# Patient Record
Sex: MALE | Race: WHITE | NOT HISPANIC OR LATINO | Employment: FULL TIME | URBAN - METROPOLITAN AREA
[De-identification: names, ages, dates, MRNs, and addresses within clinical notes are randomized per-mention and may not be internally consistent; named-entity substitution may affect disease eponyms.]

---

## 2018-05-28 ENCOUNTER — HOSPITAL ENCOUNTER (EMERGENCY)
Facility: MEDICAL CENTER | Age: 38
End: 2018-05-28
Attending: EMERGENCY MEDICINE

## 2018-05-28 VITALS
WEIGHT: 262.79 LBS | DIASTOLIC BLOOD PRESSURE: 69 MMHG | RESPIRATION RATE: 16 BRPM | SYSTOLIC BLOOD PRESSURE: 119 MMHG | HEIGHT: 72 IN | TEMPERATURE: 97.4 F | HEART RATE: 104 BPM | OXYGEN SATURATION: 97 % | BODY MASS INDEX: 35.59 KG/M2

## 2018-05-28 DIAGNOSIS — S46.211A TEAR OF RIGHT BICEPS MUSCLE, INITIAL ENCOUNTER: ICD-10-CM

## 2018-05-28 PROCEDURE — 99283 EMERGENCY DEPT VISIT LOW MDM: CPT

## 2018-05-28 ASSESSMENT — ENCOUNTER SYMPTOMS
NAUSEA: 0
VOMITING: 0
FEVER: 0
FALLS: 0

## 2018-05-28 ASSESSMENT — PAIN SCALES - GENERAL: PAINLEVEL_OUTOF10: 6

## 2018-05-28 NOTE — ED NOTES
All DC discussed for bicep tear.  He has been educated on use of sling. He verbalized understand that it is important for follow up with Orthopedics. Pt verbalized understanding of all DC instructions and follow up recommendations. Pt ambulated to lobby with upright and steady gait.

## 2018-05-28 NOTE — ED NOTES
Report right arm pain, some changes in muscle shape compared to left arm. CMS intact to lower arm.

## 2018-05-28 NOTE — DISCHARGE INSTRUCTIONS
Keep your arm in the sling. Follow-up with the Orthopaedic Surgeon. Return to the ER for any new medical problems or concerns    Muscle Tear  A muscle tear is usually caused by over-exertion or stretching. The muscle often takes a while to heal. Muscle tears require 3 to 4 weeks to heal completely. A history of the injury and a physical exam may be performed. Sometimes, the injury is identified with radiographs and an MRI. Treatment for muscle injuries includes:  · Resting and protecting the affected area until pain with motion is gone.  · Putting ice on the injured area.  ¨ Put ice in a bag.  ¨ Place a towel between your skin and the bag.  ¨ Leave the ice on for 15 to 20 minutes, 3 to 4 times a day.  ¨ After two days, you can use heat to relieve spasms.  · Using compression wraps to help control swelling and limit movement.  · Raising (elevate) the injured area above the level of the heart (if possible) for the first 1 to 2 days after the injury.  · Medicine may be prescribed to reduce pain and inflammation.  Avoid strenuous activities that bring on muscle pain. Exercises to strengthen and stretch the injured muscle can help heal the muscle and prevent repeated injury. After the pain and swelling are gone, you may begin gradual strengthening exercises. Begin range-of-motion exercises and gentle stretching after 3 to 4 days of rest.   SEEK MEDICAL CARE IF:   Your injured muscle is not improving after 1 week of treatment.     This information is not intended to replace advice given to you by your health care provider. Make sure you discuss any questions you have with your health care provider.     Document Released: 01/25/2006 Document Revised: 03/11/2013 Document Reviewed: 07/02/2010  [employer] Interactive Patient Education ©2016 [employer]

## 2018-05-28 NOTE — ED TRIAGE NOTES
"Amb to triage w/ c/o RUE pain.  Pt states that he was pushing a heavy object 2 month ago and felt a \"pop\" at his bicep.    "

## 2018-05-28 NOTE — ED PROVIDER NOTES
ED Provider Note    Scribed for Dino Martinez M.D. by Kristina Liang. 5/28/2018, 1:04 PM.    Primary care provider: No primary care provider on file.  Means of arrival: Walk-In  History obtained from: Patient  History limited by: None    CHIEF COMPLAINT  Chief Complaint   Patient presents with   • Arm Pain     x 2 months     HPI  Camron Pang is a 38 y.o. male who presents to the Emergency Department complaining of right arm pain onset 2 months. Patient was pushing a heavy object 2 months ago when he felt a pop and heard 3 pops.  He's had decreased strength and noticed a deformity to his distal biceps is that time.  Denies any pertinent past medical problems including diabetes or hypertension. Denies allergies to medications. Denies taking any regular medications.     REVIEW OF SYSTEMS - E  Review of Systems   Constitutional: Negative for fever.   Gastrointestinal: Negative for nausea and vomiting.   Musculoskeletal: Negative for falls.        Positive for arm pain     PAST MEDICAL HISTORY    No past medical history on file.    SURGICAL HISTORY  patient denies any surgical history    SOCIAL HISTORY  Accompanied by wife.     FAMILY HISTORY  No family history on file.    CURRENT MEDICATIONS  Current medications can be seen on the nurse's note.     ALLERGIES  No Known Allergies    PHYSICAL EXAM  VITAL SIGNS: /69   Pulse (!) 104   Temp 36.3 °C (97.4 °F)   Resp 16   Ht 1.829 m (6')   Wt 119.2 kg (262 lb 12.6 oz)   SpO2 97%   BMI 35.64 kg/m²   Vitals reviewed.    Constitutional: *Well developed, Well nourished, No acute distress, Non-toxic appearance.   HENT: Normocephalic, Atraumatic  Musculoskeletal: Right arm has no tenderness of the shoulder, elbow or wrist.  Normal neurovascular exam.  Good pulses.  There is a small defect with the bicep insert.  This is consistent with a biceps rupture.  There is no tenderness, redness, swelling or fullness.  Neurologic: Alert, No focal deficits noted.    Psychiatric: Affect normal    COURSE & MEDICAL DECISION MAKING  Pertinent Labs & Imaging studies reviewed. (See chart for details)    1:04 PM - Patient seen and examined at bedside. I suspect the patient tore his bicep head. I have encouraged him to see a specialist. He will be treated with a sling today and then discharged home after interventions.     Patient is placed in a sling.  He is made aware that he needs follow-up with orthopedic doctor.  He is referred to orthopedist counseled about the diagnosis, the need for follow-up.  States he will comply.      The patient will return for new or worsening symptoms and is stable at the time of discharge.    The patient is referred to a primary physician for blood pressure management, diabetic screening, and for all other preventative health concerns.    DISPOSITION:  Patient will be discharged home in stable condition.    FOLLOW UP:  Jasiel Valero M.D.  [address] [facility] [address]  [phone]    Schedule an appointment as soon as possible for a visit in 2 days        OUTPATIENT MEDICATIONS:  New Prescriptions    No medications on file         FINAL IMPRESSION  1. Tear of right biceps muscle, initial encounter          Kristina FLORES (Martaibwarren), am scribing for, and in the presence of, Dino Martinez M.D..    Electronically signed by: Kristina Liang (Hola), 5/28/2018    Dino FLORES M.D. personally performed the services described in this documentation, as scribed by Kristina Liang in my presence, and it is both accurate and complete.    The note accurately reflects work and decisions made by me.  Dino Martinez  5/28/2018  1:21 PM

## 2018-06-06 ENCOUNTER — HOSPITAL ENCOUNTER (EMERGENCY)
Facility: MEDICAL CENTER | Age: 38
End: 2018-06-06
Attending: EMERGENCY MEDICINE

## 2018-06-06 VITALS
TEMPERATURE: 97 F | RESPIRATION RATE: 16 BRPM | BODY MASS INDEX: 36.48 KG/M2 | SYSTOLIC BLOOD PRESSURE: 120 MMHG | DIASTOLIC BLOOD PRESSURE: 70 MMHG | HEART RATE: 71 BPM | WEIGHT: 268.96 LBS | OXYGEN SATURATION: 98 %

## 2018-06-06 DIAGNOSIS — S46.211A RUPTURE OF RIGHT BICEPS TENDON, INITIAL ENCOUNTER: ICD-10-CM

## 2018-06-06 PROCEDURE — 99283 EMERGENCY DEPT VISIT LOW MDM: CPT

## 2018-06-06 ASSESSMENT — PAIN SCALES - GENERAL: PAINLEVEL_OUTOF10: 4

## 2018-06-06 NOTE — ED TRIAGE NOTES
Chief Complaint   Patient presents with   • Arm Pain     dx with torn right bicep after pulling a pallet of water 2 months ago     Ambulatory to triage. Right arm in sling. VSS. Explained triage process, to waiting room. Asked to inform RN if questions or concerns arise.

## 2018-06-06 NOTE — ED NOTES
ERP eval complete. Workers comp paperwork complete. Pt states understanding of dc instructions and f/u care. Pt able to amb out w/ steady gait.

## 2018-06-06 NOTE — DISCHARGE INSTRUCTIONS
Tendon Injury  Tendons are strong, cordlike structures that connect muscle to bone. Tendons are made up of woven fibers, like a rope. A tendon injury is a tear (rupture) of the tendon. The rupture may be partial (only a few of the fibers in your tendon rupture) or complete (your entire tendon ruptures).  CAUSES   Tendon injuries can be caused by high-stress activities, such as sports. They also can be caused by a repetitive injury or by a single injury from an excessive, rapid force.  SYMPTOMS   Symptoms of tendon injury include pain when you move the joint close to the tendon. Other symptoms are swelling, redness, and warmth.  DIAGNOSIS   Tendon injuries often can be diagnosed by physical exam. However, sometimes an X-ray exam or advanced imaging, such as magnetic resonance imaging (MRI), is necessary to determine the extent of the injury.  TREATMENT   Partial tendon ruptures often can be treated with immobilization. A splint, bandage, or removable brace usually is used to immobilize the injured tendon. Most injured tendons need to be immobilized for 1-2 months before they are completely healed. Complete tendon ruptures may require surgical reattachment.     This information is not intended to replace advice given to you by your health care provider. Make sure you discuss any questions you have with your health care provider.     Document Released: 01/25/2006 Document Revised: 12/06/2012 Document Reviewed: 03/10/2013  [employer] Interactive Patient Education ©2016 [employer]

## 2018-06-06 NOTE — ED PROVIDER NOTES
CHIEF COMPLAINT  Chief Complaint   Patient presents with   • Arm Pain     dx with torn right bicep after pulling a pallet of water 2 months ago       HPI (1)  Camron Pang is a 38 y.o. male with no significant PMH who presents to the ED with c/o right arm pain since 2 months s/p injury after pushing a pallet of water. States that he heard a pop and since then has been having pain in his right biceps area. He was seen in the ED on 5/28 for the same complaints and was discharged with a sling and referral to orthopedics for possible surgical intervention. He states that he couldn't follow up with ortho as he has medicaid. Denies any worsening of pain from baseline but states that he cant go to work as he is right handed and his work involves a lot of physical activity. Denies any paresthesias/ loss of sensation in the arm.  Also c/o left arm pain since an year, associated with tingling and numbness in his left thumb.     REVIEW OF SYSTEMS(1)  Pertinent positives include: Right arm pain- at site of biceps tendon insertion.  Pertinent negatives include: no paresthesias/ loss of sensation/decreased strength.      PAST MEDICAL HISTORY(PFS1)  History reviewed. No pertinent past medical history.    SOCIAL HISTORY  Social History   Substance Use Topics   • Smoking status: Current Every Day Smoker   • Smokeless tobacco: Not on file   • Alcohol use Yes   a  History   Drug Use No       SURGICAL HISTORY  History reviewed. No pertinent surgical history.    CURRENT MEDICATIONS  Home Medications     Reviewed by Rianna Wyatt R.N. (Registered Nurse) on 06/06/18 at 1342  Med List Status: <None>   Medication Last Dose Status        Patient Roman Taking any Medications                       ALLERGIES  No Known Allergies    PHYSICAL EXAM (2)  VITAL SIGNS: /76   Pulse 81   Temp 36.1 °C (97 °F)   Resp 16   Wt 122 kg (268 lb 15.4 oz)   SpO2 98%   BMI 36.48 kg/m²    Constitutional:  Not in acute  distress.  Extremities: Right upper extremity: swollen and  tender to touch over the right biceps. Possible rupture- tendon seen shifted to the medial side. Normal sensation and pulses intact. Good  strength. No warmth/erythema/paresthesias.  ROM- limited flexion at the right elbow and painful abduction.     DIFFERENTIAL DIAGNOSIS:  Biceps tendon rupture    RADIOLOGY/PROCEDURES  No orders to display       LABORATORY: Reviewed as below.  No results found for this or any previous visit.    INTERVENTIONS:  None    COURSE & MEDICAL DECISION MAKING  38 y.o male who presented with right arm pain since 2 months s/p injury after pushing a pallet of water. Was seen in the ED on 5/28 and a referral to orthopedics was made for possible surgical intervention. Couldn't follow up with ortho because of insurance issues. Denies any worsening of pain from baseline. States that he is unable to return to work given his pain.  On exam: likely biceps tendon rupture- tendon seen shifted to medial side. No weakness/loss of sensation. Pulses intact.    PLAN:  Referral to orthopedics made to establish care.  Workers compensation note given    CONDITION: good.    FINAL IMPRESSION  1. Rupture of right biceps tendon, initial encounter          Electronically signed by: Jadyn Frias, 6/6/2018 1:58 PM    I independently evaluated the patient and repeated the important components of the history and physical.  I discussed the management with the resident.  I have reviewed and agree with the pertinent clinical information as above including history, exam, study findings and recommendations.

## 2018-06-06 NOTE — LETTER
FORM C-4:  EMPLOYEE’S CLAIM FOR COMPENSATION/ REPORT OF INITIAL TREATMENT  EMPLOYEE’S CLAIM - PROVIDE ALL INFORMATION REQUESTED   First Name  Camron Last Name  Bird Birthdate             Age  1980 38 y.o. Sex  male Claim Number   Home Employee Address  [address]  [facility]                                     [place]  [address] [address]    Weight  122 kg (268 lb 15.4 oz) N  [phone]    Mailing Employee Address                           [address]   [facility]               Zip  [address] Telephone  [phone] (home)  Primary Language Spoken  ENGLISH   Insurer  [employer] Third [employer]   [employer] Employee's Occupation (Job Title) When Injury or Occupational Disease Occurred     Employer's Name  [employer] Telephone  [phone]    Employer Address  [address]] [place] [facility] [facility]  [address]   Date of Injury  4/10/2018       Hour of Injury  10:00 AM Date Employer Notified  4/10/2018 Last Day of Work after Injury or Occupational Disease  5/24/2018 Supervisor to Whom Injury Reported  [employer]   Address or Location of Accident (if applicable)  [address] [place] [address]   What were you doing at the time of accident? (if applicable)  Pulling a pallet of water    How did this injury or occupational disease occur? Be specific and answer in detail. Use additional sheet if necessary)  Pulling a pallet to unload the pallet of water   If you believe that you have an occupational disease, when did you first have knowledge of the disability and it relationship to your employment?  n/a Witnesses to the Accident  told Manager     Nature of Injury or Occupational Disease  Contusion  Part(s) of Body Injured or Affected  Lower Arm (R), N/A, N/A    I certify that the above is true and correct to the best of my knowledge and that I have provided this information in order to obtain the benefits of [facility]  Industrial Insurance and Occupational Diseases Acts [place] inclusive or Chapter 617 of [place]).  I hereby authorize any physician, chiropractor, surgeon, practitioner, or other person, any hospital, including [facility] or [facility], any medical service organization, any insurance company, or other institution or organization to release to each other, any medical or other information, including benefits paid or payable, pertinent to this injury or disease, except information relative to diagnosis, treatment and/or counseling for AIDS, psychological conditions, alcohol or controlled substances, for which I must give specific authorization.  A Photostat of this authorization shall be as valid as the original.   Date      06/06/2018 [facility] Employee’s Signature   THIS REPORT MUST BE COMPLETED AND MAILED WITHIN 3 WORKING DAYS OF TREATMENT   Place  [facility], EMERGENCY DEPT  Name of Facility   [facility]   Date  6/6/2018 Diagnosis  (S46.211A) Rupture of right biceps tendon, initial encounter Is there evidence the injured employee was under the influence of alcohol and/or another controlled substance at the time of accident?   Hour  2:14 PM Description of Injury or Disease  Rupture of right biceps tendon, initial encounter No   Treatment  examination  Have you advised the patient to remain off work five days or more?         Yes   X-Ray Findings    Comments:na   If Yes   From Date  6/6/2018 To Date  6/13/2018   From information given by the employee, together with medical evidence, can you directly connect this injury or occupational disease as job incurred?  Yes If No, is the employee capable of: Full Duty  No Modified Duty      Is additional medical care by a physician indicated?  Yes  Comments:orthopedics for surgery If Modified Duty, Specify any Limitations / Restrictions  No use of right arm     Do you know of any previous  "injury or disease contributing to this condition or occupational disease?  No   Date  6/6/2018 Print Doctor’s Name  Yao Ashley I certify the employer’s copy of this form was mailed on: 06/06/2018   Address  [address]  [place] [address] [phone] Insurer’s Use Only   [facility]  [id number]    Provider’s Tax ID Number  [id number] Telephone  Dept: [phone]    Doctor’s Signature  e-ASHLEY Rico M.D. Degree   MD    Original - TREATING PHYSICIAN OR CHIROPRACTOR   Pg 2-Insurer/TPA   Pg 3-Employer   Pg 4-Employee                                                                                                  Form C-4 (rev01/03)     BRIEF DESCRIPTION OF RIGHTS AND BENEFITS  (Pursuant to [place]RS 616C.050)    Notice of Injury or Occupational Disease (Incident Report Form C-1): If an injury or occupational disease (OD) arises out of and in the course of employment, you must provide written notice to your employer as soon as practicable, but no later than 7 days after the accident or OD. Your employer shall maintain a sufficient supply of the required forms.    Claim for Compensation (Form C-4): If medical treatment is sought, the form C-4 is available at the place of initial treatment. A completed \"Claim for Compensation\" (Form C-4) must be filed within 90 days after an accident or OD. The treating physician or chiropractor must, within 3 working days after treatment, complete and mail to the employer, the employer's insurer and third-party , the Claim for Compensation.    Medical Treatment: If you require medical treatment for your on-the-job injury or OD, you may be required to select a physician or chiropractor from a list provided by your workers’ compensation insurer, if it has contracted with an Organization for Managed Care (MCO) or Preferred Provider Organization (PPO) or providers of health care. If your employer has not entered into a contract with an MCO or PPO, you " may select a physician or chiropractor from the Panel of Physicians and Chiropractors. Any medical costs related to your industrial injury or OD will be paid by your insurer.    Temporary Total Disability (TTD): If your doctor has certified that you are unable to work for a period of at least 5 consecutive days, or 5 cumulative days in a 20-day period, or places restrictions on you that your employer does not accommodate, you may be entitled to TTD compensation.    Temporary Partial Disability (TPD): If the wage you receive upon reemployment is less than the compensation for TTD to which you are entitled, the insurer may be required to pay you TPD compensation to make up the difference. TPD can only be paid for a maximum of 24 months.    Permanent Partial Disability (PPD): When your medical condition is stable and there is an indication of a PPD as a result of your injury or OD, within 30 days, your insurer must arrange for an evaluation by a rating physician or chiropractor to determine the degree of your PPD. The amount of your PPD award depends on the date of injury, the results of the PPD evaluation and your age and wage.    Permanent Total Disability (PTD): If you are medically certified by a treating physician or chiropractor as permanently and totally disabled and have been granted a PTD status by your insurer, you are entitled to receive monthly benefits not to exceed 66 2/3% of your average monthly wage. The amount of your PTD payments is subject to reduction if you previously received a PPD award.    Vocational Rehabilitation Services: You may be eligible for vocational rehabilitation services if you are unable to return to the job due to a permanent physical impairment or permanent restrictions as a result of your injury or occupational disease.    Transportation and Per Haris Reimbursement: You may be eligible for travel expenses and per haris associated with medical treatment.  Reopening: You may be able  to reopen your claim if your condition worsens after claim closure.    Appeal Process: If you disagree with a written determination issued by the insurer or the insurer does not respond to your request, you may appeal to the Department of Administration, , by following the instructions contained in your determination letter. You must appeal the determination within 70 days from the date of the determination letter at [address], [address], [place], [place] [address], or [address], [facility] [address] [facility] [address], [place], [place] [address]. If you disagree with the  decision, you may appeal to the Department of Administration, . You must file your appeal within 30 days from the date of the  decision letter at [address], [address], [place], [place] [address], or [address], [facility] [address], [place], [place] [address]. If you disagree with a decision of an , you may file a petition for judicial review with the District Court. You must do so within 30 days of the Appeal Officer’s decision. You may be represented by an  at your own expense or you may contact the [facility] for possible representation.    [place]  for Injured Workers (NAIW): If you disagree with a  decision, you may request that NAIW represent you without charge at an  Hearing. For information regarding denial of benefits, you may contact the [facility] at: [address], [address], [place] [address], [phone], or [address], [place] [address], [phone]    To File a Complaint with the Division: If you wish to file a complaint with the  of the Division of Industrial Relations (DIR), please contact the Workers’ Compensation Section, [address], [address], [place], [place] [address], telephone [phone]) [phone], or [address], [place]  [address], telephone [phone].    For assistance with Workers’ Compensation Issues: you may contact the Office of the Governor Consumer Health Assistance, [address] [place] [address], [facility] [address], [place] [address], Toll Free 1-790.597.4746, Web site: http://[place].[email or web address].[email or web address]., E-mail [email or web address].                                                                                                                                                                               __________________________________________________________________                                    ________06/06/2018_________            Employee Name / Signature                                                                                                                            Date                                       D-2 (rev. 10/07)

## 2018-06-11 ENCOUNTER — OCCUPATIONAL MEDICINE (OUTPATIENT)
Dept: OCCUPATIONAL MEDICINE | Facility: CLINIC | Age: 38
End: 2018-06-11
Payer: COMMERCIAL

## 2018-06-11 VITALS
DIASTOLIC BLOOD PRESSURE: 88 MMHG | HEART RATE: 99 BPM | TEMPERATURE: 97.8 F | SYSTOLIC BLOOD PRESSURE: 130 MMHG | BODY MASS INDEX: 35.89 KG/M2 | HEIGHT: 72 IN | WEIGHT: 265 LBS | OXYGEN SATURATION: 96 %

## 2018-06-11 DIAGNOSIS — S46.219A BICEPS TENDON TEAR: ICD-10-CM

## 2018-06-11 PROCEDURE — 99203 OFFICE O/P NEW LOW 30 MIN: CPT | Performed by: PREVENTIVE MEDICINE

## 2018-06-11 ASSESSMENT — PAIN SCALES - GENERAL: PAINLEVEL: 6=MODERATE PAIN

## 2018-06-11 NOTE — LETTER
"[address],   [address] [place] [address]  Phone:  [phone] - Fax:  [phone]   Occupational Health [facility] Progress Report and Disability Certification  Date of Service: 6/11/2018   No Show:  No  Date / Time of Next Visit: 7/12/2018@8:30am   Claim Information   Patient Name: Camron Pang  Claim Number:     Employer: [employer]  Date of Injury: 4/10/2018     Insurer / TPA: [employer]  ID / SSN:     Occupation: Manager  Diagnosis: The encounter diagnosis was Biceps tendon tear.    Medical Information   Related to Industrial Injury?   Comments:will await final determination from insurance    Subjective Complaints:  Date of incident 4/10/2018. Incident-\"pulling a pallet to unload the palate water\". 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 He has been seen in the emergency department on 2 occasions. Orthopedic referral was recommended but has not been accomplished. He complains of pain in the antecubital area and weakness.   Objective Findings: Appearance: Well-developed, well-nourished.   Mental Status: Mood and Affect normal. Pleasant. Cooperative. Appropriate.   ENT: Oropharynx clear. Moist mucous membranes. Hearing normal.   Eyes: Pupils reactive. Conjunctiva normal. No scleral icterus.   Neck: Trachea Midline. No thyromegaly. No masses.  Cardiovascular: Normal rate. Regular rhythm. Normal heart sounds.   Chest: Effort normal. Breath sounds clear.   Skin: Skin is warm and dry. No rash.   Musculoskeletal: Right elbow shows mild tenderness in antecubital fossa. Deformity consistent with bicep tear. Distal neurovascular intact.     Pre-Existing Condition(s):     Assessment:   Condition Same    Status: Additional Care Required  Permanent Disability:No    Plan: DiagnosticsConsultation    Diagnostics: MRI    Comments:       Disability Information   Status: Released to Restricted Duty    From:  " 6/11/2018  Through: 7/12/2018 Restrictions are: Temporary   Physical Restrictions   Sitting:    Standing:    Stooping:    Bending:      Squatting:    Walking:    Climbing:    Pushing:  < or = to 1 hr/day   Pulling:  < or = to 1 hr/day Other:    Reaching Above Shoulder (L):   Reaching Above Shoulder (R):       Reaching Below Shoulder (L):    Reaching Below Shoulder (R):      Not to exceed Weight Limits   Carrying(hrs):   Weight Limit(lb):   Lifting(hrs):   Weight  Limit(lb): < or = to 10 pounds   Comments: Limited views of right arm    Repetitive Actions   Hands: i.e. Fine Manipulations from Grasping:     Feet: i.e. Operating Foot Controls:     Driving / Operate Machinery:     Physician Name: Aneudy Flores M.D. Physician Signature: ANEUDY Flor M.D. e-Signature: Dr. Yannick Montes, Medical Director   Clinic Name / Location: [address],   [address] [place] [address] Clinic Phone Number: Dept: [phone]   Appointment Time: 1:45 Pm Visit Start Time: 1:34 PM   Check-In Time:  1:26 Pm Visit Discharge Time:  2:00pm   Original-Treating Physician or Chiropractor    Page 2-Insurer/TPA    Page 3-Employer    Page 4-Employee

## 2018-06-11 NOTE — PROGRESS NOTES
"Subjective:      Camron Pang is a 38 y.o. male who presents with Follow-Up (WC DOI 4/10/2018 - R Arm -  Same - Room 24)      Date of incident 4/10/2018. Incident-\"pulling a pallet to unload the palate water\". 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 He has been seen in the emergency department on 2 occasions. Orthopedic referral was recommended but has not been accomplished. He complains of pain in the antecubital area and weakness.     HPI    ROS  Comprehensive medical history form reviewed. Pertinent positives and negatives included in HPI.    PFSH: reviewed in Epic    PMH:  has no past medical history on file.  MEDS: No current outpatient prescriptions on file.  ALLERGIES: No Known Allergies  SURGHX: History reviewed. No pertinent surgical history.  SOCHX:  reports that he has been smoking.  He uses smokeless tobacco. He reports that he drinks alcohol. He reports that he does not use drugs.  Work Status: Works as a  for a [employer]-not working currently due to layoff  FH: No pertinent hereditary disorders.        Objective:     /88   Pulse 99   Temp 36.6 °C (97.8 °F)   Ht 1.829 m (6')   Wt 120.2 kg (265 lb)   SpO2 96%   BMI 35.94 kg/m²      Physical Exam    Appearance: Well-developed, well-nourished.   Mental Status: Mood and Affect normal. Pleasant. Cooperative. Appropriate.   ENT: Oropharynx clear. Moist mucous membranes. Hearing normal.   Eyes: Pupils reactive. Conjunctiva normal. No scleral icterus.   Neck: Trachea Midline. No thyromegaly. No masses.  Cardiovascular: Normal rate. Regular rhythm. Normal heart sounds.   Chest: Effort normal. Breath sounds clear.   Skin: Skin is warm and dry. No rash.   Musculoskeletal: Right elbow shows mild tenderness in antecubital fossa. Deformity consistent with bicep tear. Distal neurovascular intact.         Assessment/Plan:     1. Biceps tendon tear  New to Occupational Health from emergency " department     - REFERRAL TO ORTHOPEDICS  - MR-ELBOW-W/O RIGHT; Future  - REFERRAL TO RADIOLOGY  - Restricted activity  - Recheck one month with anticipated transfer to orthopedics

## 2020-01-31 ENCOUNTER — HOSPITAL ENCOUNTER (EMERGENCY)
Facility: MEDICAL CENTER | Age: 40
End: 2020-01-31
Attending: EMERGENCY MEDICINE

## 2020-01-31 ENCOUNTER — APPOINTMENT (OUTPATIENT)
Dept: RADIOLOGY | Facility: MEDICAL CENTER | Age: 40
End: 2020-01-31
Attending: EMERGENCY MEDICINE

## 2020-01-31 VITALS
HEIGHT: 72 IN | RESPIRATION RATE: 19 BRPM | WEIGHT: 260 LBS | HEART RATE: 91 BPM | OXYGEN SATURATION: 95 % | SYSTOLIC BLOOD PRESSURE: 133 MMHG | BODY MASS INDEX: 35.21 KG/M2 | DIASTOLIC BLOOD PRESSURE: 60 MMHG

## 2020-01-31 DIAGNOSIS — R11.2 NAUSEA AND VOMITING, INTRACTABILITY OF VOMITING NOT SPECIFIED, UNSPECIFIED VOMITING TYPE: ICD-10-CM

## 2020-01-31 LAB
BLOOD CULTURE HOLD CXBCH: NORMAL
EKG IMPRESSION: NORMAL

## 2020-01-31 PROCEDURE — 93005 ELECTROCARDIOGRAM TRACING: CPT | Performed by: EMERGENCY MEDICINE

## 2020-01-31 PROCEDURE — 96374 THER/PROPH/DIAG INJ IV PUSH: CPT

## 2020-01-31 PROCEDURE — 700111 HCHG RX REV CODE 636 W/ 250 OVERRIDE (IP): Performed by: EMERGENCY MEDICINE

## 2020-01-31 PROCEDURE — 99285 EMERGENCY DEPT VISIT HI MDM: CPT

## 2020-01-31 PROCEDURE — 96375 TX/PRO/DX INJ NEW DRUG ADDON: CPT

## 2020-01-31 PROCEDURE — 700105 HCHG RX REV CODE 258: Performed by: EMERGENCY MEDICINE

## 2020-01-31 RX ORDER — ONDANSETRON 2 MG/ML
4 INJECTION INTRAMUSCULAR; INTRAVENOUS ONCE
Status: DISCONTINUED | OUTPATIENT
Start: 2020-01-31 | End: 2020-01-31 | Stop reason: HOSPADM

## 2020-01-31 RX ORDER — ONDANSETRON 2 MG/ML
4 INJECTION INTRAMUSCULAR; INTRAVENOUS ONCE
Status: COMPLETED | OUTPATIENT
Start: 2020-01-31 | End: 2020-01-31

## 2020-01-31 RX ORDER — SODIUM CHLORIDE 9 MG/ML
1000 INJECTION, SOLUTION INTRAVENOUS ONCE
Status: COMPLETED | OUTPATIENT
Start: 2020-01-31 | End: 2020-01-31

## 2020-01-31 RX ORDER — PROCHLORPERAZINE EDISYLATE 5 MG/ML
10 INJECTION INTRAMUSCULAR; INTRAVENOUS ONCE
Status: DISCONTINUED | OUTPATIENT
Start: 2020-01-31 | End: 2020-01-31 | Stop reason: HOSPADM

## 2020-01-31 RX ORDER — LORAZEPAM 2 MG/ML
1 INJECTION INTRAMUSCULAR ONCE
Status: COMPLETED | OUTPATIENT
Start: 2020-01-31 | End: 2020-01-31

## 2020-01-31 RX ORDER — ONDANSETRON 4 MG/1
4 TABLET, ORALLY DISINTEGRATING ORAL EVERY 8 HOURS PRN
Qty: 9 TAB | Refills: 0 | Status: SHIPPED | OUTPATIENT
Start: 2020-01-31

## 2020-01-31 RX ORDER — DIPHENHYDRAMINE HYDROCHLORIDE 50 MG/ML
25 INJECTION INTRAMUSCULAR; INTRAVENOUS ONCE
Status: DISCONTINUED | OUTPATIENT
Start: 2020-01-31 | End: 2020-01-31 | Stop reason: HOSPADM

## 2020-01-31 RX ADMIN — LORAZEPAM 1 MG: 2 INJECTION INTRAMUSCULAR; INTRAVENOUS at 07:10

## 2020-01-31 RX ADMIN — ONDANSETRON 4 MG: 2 INJECTION INTRAMUSCULAR; INTRAVENOUS at 05:42

## 2020-01-31 RX ADMIN — SODIUM CHLORIDE 1000 ML: 9 INJECTION, SOLUTION INTRAVENOUS at 05:46

## 2020-01-31 ASSESSMENT — LIFESTYLE VARIABLES: DO YOU DRINK ALCOHOL: NO

## 2020-01-31 NOTE — ED NOTES
Pt discharged at this time. Given all prescriptions and instructions. Verbalize understanding of all dc instructions. Released to self/mom to drive via POV. IV discontinued catheter tip intact.

## 2020-01-31 NOTE — ED NOTES
"Upon getting ready for medication administration pt states \"I haven't been sleeping well so I took my mom's Venlafaxine 150 mg at 5 pm last night and then all the vomiting started\", ERP notified, medications held, EKG ordered.   "

## 2020-01-31 NOTE — ED PROVIDER NOTES
ED Provider Note    Scribed for Ethan Soto M.D. by Robin Hensley. 1/31/2020  5:35 AM    Primary care provider: Pcp Pt States None  Means of arrival: Walk-In  History obtained from: Patient  History limited by: None    CHIEF COMPLAINT  Chief Complaint   Patient presents with   • N/V       HPI  Camron Pang is a 39 y.o. male who presents to the Emergency Department for evaluation of vomiting, onset 11 hours ago. The patient states that he has been experiencing bouts of emesis all night. Notes associated diarrhea, pain on the sides of his abdomen, and a cough. His pain is exacerbated with his coughing. Denies rhinorrhea or congestion. Denies history of abdominal surgery. No alleviating or exacerbating factors.     REVIEW OF SYSTEMS  Pertinent positives include nausea, vomiting, diarrhea, abdominal pain, and a cough.   Pertinent negatives include no rhinorrhea or congestion.    All other systems reviewed and negative. See HPI for further details.     PAST MEDICAL HISTORY       SURGICAL HISTORY  patient denies any surgical history    SOCIAL HISTORY  Social History     Tobacco Use   • Smoking status: Current Every Day Smoker     Packs/day: 0.00   • Smokeless tobacco: Current User   Substance Use Topics   • Alcohol use: Yes   • Drug use: No      Social History     Substance and Sexual Activity   Drug Use No       FAMILY HISTORY  History reviewed. No pertinent family history.    CURRENT MEDICATIONS  Home Medications     Reviewed by Lorenza Kirkland R.N. (Registered Nurse) on 01/31/20 at 0517  Med List Status: Complete   Medication Last Dose Status        Patient Roman Taking any Medications                       ALLERGIES  No Known Allergies    PHYSICAL EXAM  VITAL SIGNS: /100   Pulse 73   Resp 19   Ht 1.829 m (6')   Wt 117.9 kg (260 lb)   SpO2 99%   BMI 35.26 kg/m²     Nursing note and vitals reviewed.  Constitutional: Uncomfortable appearing, Moderate distress secondary to nausea,  Well-developed and well-nourished.  HENT: Dry mucous membranes, Head is normocephalic and atraumatic. Oropharynx is clear without exudate or erythema.   Eyes: Pupils are equal, round, and reactive to light. Conjunctiva are normal.   Cardiovascular: Normal rate and regular rhythm. No murmur heard. Normal radial pulses.  Pulmonary/Chest: Breath sounds normal. No wheezes or rales.   Abdominal: Unable to illicit abdominal tenderness, Soft and non-tender. No distention    Musculoskeletal: Extremities exhibit normal range of motion without edema or tenderness.   Neurological: Awake, alert and oriented to person, place, and time. No focal deficits noted.  Skin: Skin is warm and dry. No rash.   Psychiatric: Normal mood and affect. Appropriate for clinical situation.    DIAGNOSTIC STUDIES / PROCEDURES    EKG Interpretation  Interpreted by me as below    LABS  Results for orders placed or performed during the hospital encounter of 20   Blood Culture,Hold   Result Value Ref Range    Blood Culture Hold Collected    EKG (NOW)   Result Value Ref Range    Report       [facility] Emergency Dept.    Test Date:  2020  Pt Name:    MARIA R OJEDA             Department: ER  MRN:        [id number]                      Room:       [facility]  Gender:     Male                         Technician: [id number]  :        1980                   Requested By:HUSSEIN GIBBS  Order #:    [id number]                    Reading MD: HUSSEIN GIBBS MD    Measurements  Intervals                                Axis  Rate:       58                           P:          23  OK:         144                          QRS:        3  QRSD:       100                          T:          44  QT:         432  QTc:        425    Interpretive Statements  SINUS BRADYCARDIA  ATRIAL PREMATURE COMPLEX  No previous ECG available for comparison  Electronically Signed On 2020 8:23:35 PST by HUSSEIN GIBBS MD       All labs reviewed by  me.    COURSE & MEDICAL DECISION MAKING  Nursing notes, VS, PMSFHx reviewed in chart.     Review of past medical records shows the patient was last seen here June 2018 for unrelated complaints.      5:35 AM - The patient presents today with nausea, vomiting, and diarrhea. The patient has a benign abdominal exam. There is no tenderness to make me concerned for more serious intra-abdominal pathology. The patient was treated with Zofran for nausea. On reassessment the patient was feeling better. The patient continued to have a nonsurgical abdomen. Overall the patient is improved and will be discharged home with a prescription of Zofran. I feel that this patient is a good outpatient treatment candidate. I recommended that the patient return to the emergency department should they have any abdominal discomfort does not resolve within 24 hours. Discussed treatment of his nausea and vomiting. Informed them that he likely has a Norovirus infection. Patient will be treated with NS infusion 1000 ml and Zofran injection 4mg. Intravenous fluids were administered for dry mucous membranes.  The differential diagnoses include but are not limited to: likely viral     5:47 AM - I have ordered blood culture.     6:50 AM - The patient's nurse informed me that the patient improved slightly with the Zofran, but his improvement has ceased. I have ordered Compazine injection 10mg and Benadryl injection 25mg.    7:01 AM - The patient's nurse informed me that the patient is now stating that he took Faxine 150mg and Venzalaxine prior to the onset of his vomiting, stating, he has been having trouble sleeping so he took some of his mother-in-laws anti-depressant medication. The compazine has been help until a EKG can be resulted. I have ordered EKG for further evaluation.     7:08 AM - The patient is unable to tolerate the EKG. I have ordered Ativan 1mg injection.    7:19 AM - The patient's EKG has been reviewed and signed. He is cleared for  the Compazine injection.    8:32 AM - The patient was reevaluated at bedside. His bouts of emesis have ceased and he endorses feeling improved. He was informed of his future discharge with a Zofran precription with return precautions.     HYDRATION: Based on the patient's presentation of Dehydration the patient was given IV fluids. IV Hydration was used because oral hydration was not adequate alone. Upon recheck following hydration, the patient was improved.  HTN/IDDM FOLLOW UP:  The patient is referred to a primary physician for blood pressure management, diabetic screening, and for all other preventive health concerns    The patient will return for new or worsening symptoms and is stable at the time of discharge.    The patient is referred to a primary physician for blood pressure management, diabetic screening, and for all other preventative health concerns.      DISPOSITION:  Patient will be discharged home in stable condition.    FOLLOW UP:  [facility], Emergency Dept  [facility] [facility] [address] [phone]    If symptoms worsen      OUTPATIENT MEDICATIONS:  New Prescriptions    ONDANSETRON (ZOFRAN ODT) 4 MG TABLET DISPERSIBLE    Take 1 Tab by mouth every 8 hours as needed.         FINAL IMPRESSION  1. Nausea and vomiting, intractability of vomiting not specified, unspecified vomiting type          I, Robin Hensley (Hola), am scribing for, and in the presence of, Ethan Soto M.D..    Electronically signed by: Robin Hensley (Hola), 1/31/2020    IEthan M.D. personally performed the services described in this documentation, as scribed by Robin Hensley in my presence, and it is both accurate and complete. C    The note accurately reflects work and decisions made by me.  Ethan Soto M.D.  1/31/2020  11:08 AM

## 2020-11-06 ENCOUNTER — APPOINTMENT (OUTPATIENT)
Dept: URGENT CARE | Facility: CLINIC | Age: 40
End: 2020-11-06